# Patient Record
Sex: FEMALE | Race: WHITE | NOT HISPANIC OR LATINO | Employment: FULL TIME | URBAN - METROPOLITAN AREA
[De-identification: names, ages, dates, MRNs, and addresses within clinical notes are randomized per-mention and may not be internally consistent; named-entity substitution may affect disease eponyms.]

---

## 2017-02-02 ENCOUNTER — OFFICE VISIT (OUTPATIENT)
Dept: URGENT CARE | Facility: MEDICAL CENTER | Age: 57
End: 2017-02-02
Payer: COMMERCIAL

## 2017-02-02 PROCEDURE — G0008 ADMIN INFLUENZA VIRUS VAC: HCPCS

## 2017-02-02 PROCEDURE — 90656 IIV3 VACC NO PRSV 0.5 ML IM: CPT

## 2017-02-02 PROCEDURE — 90686 IIV4 VACC NO PRSV 0.5 ML IM: CPT

## 2019-03-07 ENCOUNTER — HOSPITAL ENCOUNTER (EMERGENCY)
Facility: HOSPITAL | Age: 59
Discharge: HOME | End: 2019-03-07
Attending: EMERGENCY MEDICINE | Admitting: EMERGENCY MEDICINE
Payer: COMMERCIAL

## 2019-03-07 VITALS
HEART RATE: 77 BPM | RESPIRATION RATE: 16 BRPM | OXYGEN SATURATION: 96 % | SYSTOLIC BLOOD PRESSURE: 134 MMHG | WEIGHT: 214.73 LBS | DIASTOLIC BLOOD PRESSURE: 88 MMHG | BODY MASS INDEX: 34.66 KG/M2
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
## 2019-03-07 PROCEDURE — 99283 EMERGENCY DEPT VISIT LOW MDM: CPT

## 2021-03-31 DIAGNOSIS — Z23 ENCOUNTER FOR IMMUNIZATION: ICD-10-CM

## 2021-11-11 ENCOUNTER — OCCMED (OUTPATIENT)
Dept: URGENT CARE | Facility: MEDICAL CENTER | Age: 61
End: 2021-11-11
Payer: OTHER MISCELLANEOUS

## 2021-11-11 ENCOUNTER — APPOINTMENT (OUTPATIENT)
Dept: RADIOLOGY | Facility: MEDICAL CENTER | Age: 61
End: 2021-11-11
Attending: PHYSICIAN ASSISTANT
Payer: COMMERCIAL
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
## 2021-11-11 PROCEDURE — G0382 LEV 3 HOSP TYPE B ED VISIT: HCPCS

## 2021-11-11 PROCEDURE — 73564 X-RAY EXAM KNEE 4 OR MORE: CPT

## 2021-11-11 PROCEDURE — 99283 EMERGENCY DEPT VISIT LOW MDM: CPT

## 2021-11-18 ENCOUNTER — APPOINTMENT (OUTPATIENT)
Dept: URGENT CARE | Facility: MEDICAL CENTER | Age: 61
End: 2021-11-18
Payer: OTHER MISCELLANEOUS

## 2021-11-18 PROCEDURE — 99213 OFFICE O/P EST LOW 20 MIN: CPT | Performed by: PHYSICIAN ASSISTANT

## 2023-02-22 ENCOUNTER — PREP FOR PROCEDURE (OUTPATIENT)
Dept: GASTROENTEROLOGY | Facility: CLINIC | Age: 63
End: 2023-02-22

## 2023-02-22 DIAGNOSIS — K21.9 GASTROESOPHAGEAL REFLUX DISEASE, UNSPECIFIED WHETHER ESOPHAGITIS PRESENT: Primary | ICD-10-CM

## 2023-03-14 ENCOUNTER — ANESTHESIA EVENT (OUTPATIENT)
Dept: GASTROENTEROLOGY | Facility: HOSPITAL | Age: 63
End: 2023-03-14

## 2023-03-14 ENCOUNTER — HOSPITAL ENCOUNTER (OUTPATIENT)
Dept: GASTROENTEROLOGY | Facility: HOSPITAL | Age: 63
Setting detail: OUTPATIENT SURGERY
Discharge: HOME | End: 2023-03-14
Attending: COLON & RECTAL SURGERY | Admitting: COLON & RECTAL SURGERY

## 2023-03-14 ENCOUNTER — ANESTHESIA (OUTPATIENT)
Dept: GASTROENTEROLOGY | Facility: HOSPITAL | Age: 63
End: 2023-03-14

## 2023-03-14 VITALS
HEART RATE: 67 BPM | OXYGEN SATURATION: 98 % | TEMPERATURE: 98.2 F | SYSTOLIC BLOOD PRESSURE: 118 MMHG | DIASTOLIC BLOOD PRESSURE: 69 MMHG | HEIGHT: 67 IN | WEIGHT: 190.7 LBS | RESPIRATION RATE: 20 BRPM | BODY MASS INDEX: 29.93 KG/M2

## 2023-03-14 DIAGNOSIS — K29.60 EROSIVE GASTRITIS: Primary | ICD-10-CM

## 2023-03-14 DIAGNOSIS — R19.4 CHANGE IN BOWEL HABITS: ICD-10-CM

## 2023-03-14 DIAGNOSIS — R19.7 DIARRHEA, UNSPECIFIED TYPE: ICD-10-CM

## 2023-03-14 DIAGNOSIS — K21.9 GASTROESOPHAGEAL REFLUX DISEASE, UNSPECIFIED WHETHER ESOPHAGITIS PRESENT: ICD-10-CM

## 2023-03-14 RX ORDER — LIDOCAINE HYDROCHLORIDE 20 MG/ML
INJECTION, SOLUTION EPIDURAL; INFILTRATION; INTRACAUDAL; PERINEURAL AS NEEDED
Status: DISCONTINUED | OUTPATIENT
Start: 2023-03-14 | End: 2023-03-14

## 2023-03-14 RX ORDER — PROPOFOL 10 MG/ML
INJECTION, EMULSION INTRAVENOUS AS NEEDED
Status: DISCONTINUED | OUTPATIENT
Start: 2023-03-14 | End: 2023-03-14

## 2023-03-14 RX ORDER — SODIUM CHLORIDE, SODIUM LACTATE, POTASSIUM CHLORIDE, CALCIUM CHLORIDE 600; 310; 30; 20 MG/100ML; MG/100ML; MG/100ML; MG/100ML
INJECTION, SOLUTION INTRAVENOUS CONTINUOUS PRN
Status: DISCONTINUED | OUTPATIENT
Start: 2023-03-14 | End: 2023-03-14

## 2023-03-14 RX ORDER — PANTOPRAZOLE SODIUM 40 MG/1
40 TABLET, DELAYED RELEASE ORAL
Qty: 60 TABLET | Refills: 5 | Status: SHIPPED | OUTPATIENT
Start: 2023-03-14

## 2023-03-14 RX ORDER — LETROZOLE 2.5 MG/1
2.5 TABLET, FILM COATED ORAL DAILY
COMMUNITY

## 2023-03-14 RX ADMIN — PROPOFOL 200 MG: 10 INJECTION, EMULSION INTRAVENOUS at 08:04

## 2023-03-14 RX ADMIN — SODIUM CHLORIDE, SODIUM LACTATE, POTASSIUM CHLORIDE, AND CALCIUM CHLORIDE: .6; .31; .03; .02 INJECTION, SOLUTION INTRAVENOUS at 07:15

## 2023-03-14 RX ADMIN — LIDOCAINE HYDROCHLORIDE 5 ML: 20 INJECTION, SOLUTION EPIDURAL; INFILTRATION; INTRACAUDAL; PERINEURAL at 08:04

## 2023-03-14 RX ADMIN — PROPOFOL 100 MG: 10 INJECTION, EMULSION INTRAVENOUS at 08:11

## 2023-03-14 NOTE — ANESTHESIA PREPROCEDURE EVALUATION
Procedure:  COLONOSCOPY  EGD    Relevant Problems   CARDIO   (+) Hyperlipidemia      GYN   (+) Breast cancer ([facility] [address] ) (on hormone therapy)        Physical Exam    Airway    Mallampati score: II  TM Distance: >3 FB  Neck ROM: full     Dental       Cardiovascular      Pulmonary      Other Findings        Anesthesia Plan  ASA Score- 2     Anesthesia Type- IV sedation with anesthesia with ASA Monitors  Additional Monitors:   Airway Plan:     Comment: Recent labs personally reviewed:  Lab Results       Component                Value               Date                       WBC                      4 0 (L)             07/15/2014                 HGB                      14 3                07/15/2014                 PLT                      255                 07/15/2014            Lab Results       Component                Value               Date                       NA                       143                 07/15/2014                 K                        4 6                 07/15/2014                 BUN                      16                  07/15/2014                 CREATININE               0 6                 07/15/2014                 GLUCOSE                  73 (L)              07/15/2014            No results found for: PTT   No results found for: INR    Blood type     I, Trae Chandra MD, have personally seen and evaluated the patient prior to anesthetic care  I have reviewed the pre-anesthetic record, medical history, allergies, medications and any other medical records if appropriate to the anesthetic care  If a CRNA is involved in the case, I have reviewed the CRNA assessment, if present, and agree  Patient consented for IV Sedation, general anesthesia as back up  Discussed risks of aspiration, IV infiltration, indications for conversion to general anesthesia  All questions and concerns addressed          Plan Factors-Exercise tolerance (METS): >4 METS  Chart reviewed   Patient summary reviewed  Patient is not a current smoker  Patient did not smoke on day of surgery  Obstructive sleep apnea risk education given perioperatively  Induction- intravenous  Postoperative Plan-     Informed Consent- Anesthetic plan and risks discussed with patient  I personally reviewed this patient with the CRNA  Discussed and agreed on the Anesthesia Plan with the CRNA  Carol Dos Santos

## 2023-03-14 NOTE — H&P
History and Physical -  [facility]  Inell Galindo Huang 59 y o  female MRN: [id number]                  HPI: Kemar Rosenberg is a 59y o  year old female who presents for EGD for chronic GERD, rule out Sheehan's esophagus      REVIEW OF SYSTEMS: Per the HPI, and otherwise unremarkable  Historical Information   Past Medical History:   Diagnosis Date   • Breast cancer ([address] )     left   • Hyperlipidemia      Past Surgical History:   Procedure Laterality Date   • BREAST SURGERY       Social History   Social History     Substance and Sexual Activity   Alcohol Use Never     Social History     Substance and Sexual Activity   Drug Use Never     Social History     Tobacco Use   Smoking Status Never   Smokeless Tobacco Never     History reviewed  No pertinent family history  Meds/Allergies     (Not in a hospital admission)      Allergies   Allergen Reactions   • Azithromycin      Other reaction(s): Unknown   • Clarithromycin      Other reaction(s): Unknown   • Ibuprofen      Other reaction(s): Unknown   • Iodinated Contrast Media    • Levofloxacin      Other reaction(s): Unknown   • Montelukast    • Nuts - Food Allergy      Other reaction(s): Unknown   • Shellfish-Derived Products - Food Allergy      Other reaction(s): Unknown       Objective     Blood pressure 131/84, pulse 76, temperature (!) 97 2 °F (36 2 °C), temperature source Temporal, resp  rate 20, height 5' 7" (1 702 m), weight 86 5 kg (190 lb 11 2 oz), SpO2 97 %        PHYSICAL EXAM    Gen: NAD  CV: RRR  CHEST: Clear  ABD: soft, NT/ND  EXT: no edema  Neuro: AAO      ASSESSMENT/PLAN:  This is a 59y o  year old female here for chronic GERD    PLAN:   Procedure: EGD

## 2023-03-14 NOTE — ANESTHESIA POSTPROCEDURE EVALUATION
Post-Op Assessment Note    CV Status:  Stable    Pain management: adequate     Mental Status:  Sleepy   Hydration Status:  Euvolemic   PONV Controlled:  Controlled   Airway Patency:  Patent      Post Op Vitals Reviewed: Yes      Staff: CRNA         No notable events documented      BP   97/64   Temp     Pulse  60   Resp   20   SpO2   98

## 2023-03-14 NOTE — H&P
H&P EXAM - Outpatient Endoscopy   Arianna Adamson 59 y o  female MRN: [id number]    [facility] ENDOSCOPY   Encounter: [id number]        Impression: abnormal cea/hx brest cancer    Last Colonoscopy: 2014    Past Medical History:   Diagnosis Date   • Breast cancer ([address] )     left   • Hyperlipidemia         Past Surgical History:   Procedure Laterality Date   • BREAST SURGERY          Social History      Plan:colonoscopy    Chief Complaint: abn cea    Physical Exam:   Chest: clear   Heart: nl     Vitals:    03/14/23 0649   BP: 131/84   Pulse: 76   Resp: 20   Temp: (!) 97 2 °F (36 2 °C)   SpO2: 97%

## 2023-03-14 NOTE — INTERVAL H&P NOTE
H&P reviewed  After examining the patient I find no changes in the patients condition since the H&P had been written      Vitals:    03/14/23 0649   BP: 131/84   Pulse: 76   Resp: 20   Temp: (!) 97 2 °F (36 2 °C)   SpO2: 97%

## 2023-04-04 ENCOUNTER — TELEPHONE (OUTPATIENT)
Dept: OTHER | Facility: OTHER | Age: 63
End: 2023-04-04

## 2023-04-04 NOTE — TELEPHONE ENCOUNTER
Patient requesting a call back to discuss test results         Ordering Provider: Dr Shweta Ndiaye  Date Completed: 3/14/23    Lab []  MRI []  X-Ray []  CT []  Colonoscopy [x]  EGD [x]  Biopsy []  Other []

## 2023-04-24 ENCOUNTER — TELEPHONE (OUTPATIENT)
Dept: OTHER | Facility: OTHER | Age: 63
End: 2023-04-24

## 2023-04-24 NOTE — TELEPHONE ENCOUNTER
Patient is requesting a call back from the office to make sure she is on the correct medication pantoprazole (PROTONIX) 40 mg tablet     She is not sure if her medication was changed to the delayed release and if so she would like to discuss further  She also has questions on how long she should be taking it  She is feeling very sluggish and wants to know if its the medication

## 2023-04-24 NOTE — TELEPHONE ENCOUNTER
Spoke w/ the pt, she stated she's unsure if she should be on pantoprazole for 8 weeks or one year      Also stated that she's been feeling very tired while taking the medication and wanted to know if that was a side effect

## 2023-04-27 DIAGNOSIS — K29.60 EROSIVE GASTRITIS: ICD-10-CM

## 2023-04-27 RX ORDER — PANTOPRAZOLE SODIUM 40 MG/1
40 TABLET, DELAYED RELEASE ORAL
Qty: 90 TABLET | Refills: 3 | Status: SHIPPED | OUTPATIENT
Start: 2023-04-27

## 2023-04-27 NOTE — TELEPHONE ENCOUNTER
Patient reports it was her allergy medication making her tired, not the Pantoprazole  She would like to know if she should just take it for 8 weeks and when she should be scheduled for her tests  She would like a call back to advise

## 2023-04-27 NOTE — TELEPHONE ENCOUNTER
Please tell the patient to stop the pantoprazole after taking it for 8 weeks  I do not believe I have any other tests ordered for her

## 2023-06-02 ENCOUNTER — TELEPHONE (OUTPATIENT)
Dept: OTHER | Facility: OTHER | Age: 63
End: 2023-06-02

## 2023-06-02 NOTE — TELEPHONE ENCOUNTER
Patient is requesting a call back from the clinical team regarding her medication pantoprazole (PROTONIX) 40 mg tablet   She states she has been feeling very sleepy and has been taking the medication for close to 10 weeks and wants to discuss further with the clinical team

## 2024-01-31 ENCOUNTER — OFFICE VISIT (OUTPATIENT)
Dept: CARDIOLOGY CLINIC | Facility: CLINIC | Age: 64
End: 2024-01-31
Payer: COMMERCIAL

## 2024-01-31 VITALS
DIASTOLIC BLOOD PRESSURE: 68 MMHG | HEIGHT: 67 IN | BODY MASS INDEX: 31.39 KG/M2 | SYSTOLIC BLOOD PRESSURE: 105 MMHG | HEART RATE: 95 BPM | OXYGEN SATURATION: 98 % | WEIGHT: 200 LBS | RESPIRATION RATE: 18 BRPM

## 2024-01-31 DIAGNOSIS — Z17.0 MALIGNANT NEOPLASM OF UPPER-OUTER QUADRANT OF LEFT BREAST IN FEMALE, ESTROGEN RECEPTOR POSITIVE: ICD-10-CM

## 2024-01-31 DIAGNOSIS — R93.1 AGATSTON CORONARY ARTERY CALCIUM SCORE LESS THAN 100: Primary | ICD-10-CM

## 2024-01-31 DIAGNOSIS — E78.01 FAMILIAL HYPERCHOLESTEROLEMIA: ICD-10-CM

## 2024-01-31 DIAGNOSIS — C50.412 MALIGNANT NEOPLASM OF UPPER-OUTER QUADRANT OF LEFT BREAST IN FEMALE, ESTROGEN RECEPTOR POSITIVE: ICD-10-CM

## 2024-01-31 PROCEDURE — 99245 OFF/OP CONSLTJ NEW/EST HI 55: CPT | Performed by: INTERNAL MEDICINE

## 2024-01-31 RX ORDER — FAMOTIDINE 10 MG
10 TABLET ORAL DAILY
COMMUNITY

## 2024-01-31 RX ORDER — ROSUVASTATIN CALCIUM 40 MG/1
40 TABLET, COATED ORAL DAILY
Qty: 30 TABLET | Refills: 12 | Status: SHIPPED | OUTPATIENT
Start: 2024-01-31

## 2024-01-31 NOTE — PROGRESS NOTES
Cardiology Consultation     Dior Huang  [id number]  1960  [address] [facility] [address] [address]    Soledad comes in for cardiac consultation.  She has a past medical history of GERD, mild intermittent asthma, hypercholesterolemia, carcinoma of the upper outer quadrant of the left breast, estrogen receptor positive.  She saw her PCP at [facility] and and requested a calcium score.     She now feels great. She has some hip bursisitis so stopped treadmill and is doing the bike. She exercises a lot with out symptoms. No cp, sob, syncope. She has had some palpitations a few months ago. She doesn't smoke nor drink. No family history of premature CAD.     CT calcium score 12/28/2023:  IMPRESSION:   Impression: Agatston calcium score of 34.   Calcium Score: There is mild calcified plaque involving the coronary arteries,   with an Agatston score of 34 calculated. This places the patient in the 76th   percentile for age and sex.     Arian Nuc stress 11/25/2019:   Impression:   1. Negative EKG portion of lexiscan nuclear stress test.   2. Adequate hemodynamic response to lexiscan noted.   3. Nuclear portion dictated separately.   Conclusion   Normal Lexiscan nuclear stress test   LVEF 75%   No ischemia seen   EKG portion reported separately     Echo 11/05/2019:   CONCLUSIONS   -----------   1. The left ventricle size is normal.   2. Mild tricuspid regurgitation present.   3. There is no pericardial effusion.   4. The aortic root is dilated. Mild in nature   Conclusions completed       Patient Active Problem List   Diagnosis    Breast cancer (HCC)    Hyperlipidemia     Past Medical History:   Diagnosis Date    Breast cancer (HCC)     left    Hyperlipidemia      Social History     Socioeconomic History    Marital status: /Civil Union     Spouse name: Not on file    Number of children: Not on file     "Years of education: Not on file    Highest education level: Not on file   Occupational History    Not on file   Tobacco Use    Smoking status: Never    Smokeless tobacco: Never   Vaping Use    Vaping status: Never Used   Substance and Sexual Activity    Alcohol use: Never    Drug use: Never    Sexual activity: Not on file   Other Topics Concern    Not on file   Social History Narrative    Not on file     Social Determinants of Health     Financial Resource Strain: Not on file   Food Insecurity: Not on file   Transportation Needs: Not on file   Physical Activity: Not on file   Stress: Not on file   Social Connections: Not on file   Intimate Partner Violence: Not on file   Housing Stability: Not on file      No family history on file.  Past Surgical History:   Procedure Laterality Date    BREAST SURGERY         Current Outpatient Medications:     Calcium-Phosphorus-Vitamin D (CITRACAL +D3 PO), Take 400 mg by mouth 2 (two) times a day, Disp: , Rfl:     famotidine (PEPCID) 10 mg tablet, Take 10 mg by mouth daily As needed, Disp: , Rfl:     letrozole (FEMARA) 2.5 mg tablet, Take 2.5 mg by mouth daily, Disp: , Rfl:     Multiple Vitamins-Minerals (MULTI FOR HER 50+ PO), Take by mouth in the morning, Disp: , Rfl:     Probiotic Product (PROBIOTIC-10 PO), Take by mouth in the morning, Disp: , Rfl:   Allergies   Allergen Reactions    Azithromycin      Other reaction(s): Unknown    Clarithromycin      Other reaction(s): Unknown    Ibuprofen      Other reaction(s): Unknown    Iodinated Contrast Media     Levofloxacin      Other reaction(s): Unknown    Montelukast     Nuts - Food Allergy      Other reaction(s): Unknown    Shellfish-Derived Products - Food Allergy      Other reaction(s): Unknown     Vitals:    01/31/24 0926   BP: (!) 0/0   BP Location: Right arm   Patient Position: Sitting   Cuff Size: Standard   Weight: 90.7 kg (200 lb)   Height: 5' 7\" (1.702 m)       Labs:  Lab Results   Component Value Date    CHOL 326 (H) " 07/07/2014    TRIG 60 07/07/2014     Imaging: No results found.    Review of Systems:  Review of Systems negative except for HPI.    Physical Exam:  Physical Exam  GEN: Alert and oriented x 3, in no acute distress.  Well appearing and well nourished.   HEENT: Sclera anicteric, conjunctivae pink, mucous membranes moist. Oropharynx clear.   NECK: Supple, no carotid bruits, no significant JVD. Trachea midline, no thyromegaly.   HEART: Regular rhythm, normal S1 and S2, no murmurs, clicks, gallops or rubs. PMI nondisplaced, no thrills.   LUNGS: Clear to auscultation bilaterally; no wheezes, rales, or rhonchi. No increased work of breathing or signs of respiratory distress.   ABDOMEN: Soft, nontender, nondistended, normoactive bowel sounds.   EXTREMITIES: Skin warm and well perfused, no clubbing, cyanosis, or edema.  NEURO: No focal findings. Normal speech. Mood and affect normal.   SKIN: Normal without suspicious lesions on exposed skin.     Impression:  Mildly elevated coronary calcium score  Familial hypercholesterolemia  Asthma  GERD  History of breast CA  Palpitations    Discussion/Summary:Dior has elevated calcium score and familial hypercholesterolemia.   She is asymptomatic.  I recommend starting a statin.   She  is very reluctant to start a statin.   Risks and benefits discussed at length.     The estimated 10-year risk of a CHD event for a person with this risk factor profile including coronary calcium is 4.3%. The estimated 10-year risk of a CHD event for a person with this risk factor profile if we did not factor in their coronary calcium score would be 2.9%.    Extensive time >60 minutes     Recommend aggressive risk factor modification and therapeutic lifestyle changes.  Low-salt, low-calorie, low-fat, low-cholesterol diet with regular exercise and to optimize weight.  I will defer the ordering and monitoring of necessity lab studies to you, but I am available and happy to review and manage any of the  data at your request in the future.    Discussed concepts of atherosclerosis, including signs and symptoms of cardiac disease.    Previous studies were reviewed.    Safety measures were reviewed.  All questions were entertained and answered.  Patient was advised to report any problems requiring medical attention.    Follow-up with PCP and appropriate specialist and lab work as discussed.    Return for follow up visit as scheduled or earlier, if needed.  Thank you for allowing me to participate in the care and evaluation of your patient.  Should you have any questions, please feel free to contact me.

## 2024-02-07 ENCOUNTER — TELEPHONE (OUTPATIENT)
Age: 64
End: 2024-02-07

## 2024-02-29 ENCOUNTER — APPOINTMENT (OUTPATIENT)
Dept: RADIOLOGY | Facility: MEDICAL CENTER | Age: 64
End: 2024-02-29
Payer: OTHER MISCELLANEOUS

## 2024-02-29 ENCOUNTER — OCCMED (OUTPATIENT)
Dept: URGENT CARE | Facility: MEDICAL CENTER | Age: 64
End: 2024-02-29
Payer: OTHER MISCELLANEOUS

## 2024-02-29 DIAGNOSIS — S99.911A INJURY OF RIGHT ANKLE, INITIAL ENCOUNTER: Primary | ICD-10-CM

## 2024-02-29 DIAGNOSIS — S99.911A INJURY OF RIGHT ANKLE, INITIAL ENCOUNTER: ICD-10-CM

## 2024-02-29 PROCEDURE — G0382 LEV 3 HOSP TYPE B ED VISIT: HCPCS

## 2024-02-29 PROCEDURE — 73610 X-RAY EXAM OF ANKLE: CPT

## 2024-02-29 PROCEDURE — 99283 EMERGENCY DEPT VISIT LOW MDM: CPT
